# Patient Record
Sex: FEMALE | Race: WHITE | NOT HISPANIC OR LATINO | URBAN - METROPOLITAN AREA
[De-identification: names, ages, dates, MRNs, and addresses within clinical notes are randomized per-mention and may not be internally consistent; named-entity substitution may affect disease eponyms.]

---

## 2021-04-09 ENCOUNTER — OFFICE VISIT (OUTPATIENT)
Dept: URBAN - METROPOLITAN AREA CLINIC 7 | Facility: CLINIC | Age: 86
End: 2021-04-09
Payer: COMMERCIAL

## 2021-04-09 DIAGNOSIS — H43.813 VITREOUS DEGENERATION, BILATERAL: ICD-10-CM

## 2021-04-09 DIAGNOSIS — Z96.1 PRESENCE OF PSEUDOPHAKIA: ICD-10-CM

## 2021-04-09 DIAGNOSIS — H35.3233 EXUDATIVE AGE-RELATED MACULAR DEGENERATION, BILATERAL, WITH INACTIVE SCAR: Primary | ICD-10-CM

## 2021-04-09 PROCEDURE — 92134 CPTRZ OPH DX IMG PST SGM RTA: CPT | Performed by: OPHTHALMOLOGY

## 2021-04-09 PROCEDURE — 92004 COMPRE OPH EXAM NEW PT 1/>: CPT | Performed by: OPHTHALMOLOGY

## 2021-04-09 ASSESSMENT — INTRAOCULAR PRESSURE
OS: 11
OD: 11

## 2021-04-09 NOTE — IMPRESSION/PLAN
Impression: Presence of pseudophakia: Z96.1.  Bilateral. Plan: --stable IOL OU
--recommend observation

## 2021-04-09 NOTE — IMPRESSION/PLAN
Impression: Exudative age-related macular degeneration, bilateral, with inactive scar: H35.3233. Bilateral. Plan: --findings d/w pt in detail --unfortunately no available tx for subretinal scar from CNV  
--observe and cont [employer]ler grid monitoring to aid in detection of recurrent CNV
--monitor annually RTC 12 months for OCT OU

## 2022-06-10 ENCOUNTER — OFFICE VISIT (OUTPATIENT)
Dept: URBAN - METROPOLITAN AREA CLINIC 7 | Facility: CLINIC | Age: 87
End: 2022-06-10
Payer: COMMERCIAL

## 2022-06-10 DIAGNOSIS — H40.9 GLAUCOMA: ICD-10-CM

## 2022-06-10 DIAGNOSIS — H43.813 VITREOUS DEGENERATION, BILATERAL: ICD-10-CM

## 2022-06-10 DIAGNOSIS — Z96.1 PRESENCE OF PSEUDOPHAKIA: ICD-10-CM

## 2022-06-10 DIAGNOSIS — H35.3233 EXUDATIVE AGE-RELATED MACULAR DEGENERATION, BILATERAL, WITH INACTIVE SCAR: Primary | ICD-10-CM

## 2022-06-10 PROCEDURE — 92134 CPTRZ OPH DX IMG PST SGM RTA: CPT | Performed by: OPHTHALMOLOGY

## 2022-06-10 PROCEDURE — 92014 COMPRE OPH EXAM EST PT 1/>: CPT | Performed by: OPHTHALMOLOGY

## 2022-06-10 ASSESSMENT — INTRAOCULAR PRESSURE
OS: 13
OD: 15

## 2022-06-10 NOTE — IMPRESSION/PLAN
Impression: Glaucoma: H40.9 Bilateral. Plan: --continue latanoprost OU QHS
--followed by glaucoma specialist

## 2022-06-10 NOTE — IMPRESSION/PLAN
Impression: Exudative age-related macular degeneration, bilateral, with inactive scar: H35.3233. Bilateral. Plan: --DFE OU confirms a stable macular scar secondary to wet AMD OU
--OCT confirms lack of IRF or SRF OU at this time --unfortunately no available tx for subretinal scar from CNV  
--observe and cont Amsler grid monitoring
--monitor annually RTC 12 months for DFE/OCT OU